# Patient Record
Sex: FEMALE | Race: WHITE | NOT HISPANIC OR LATINO | Employment: UNEMPLOYED | URBAN - METROPOLITAN AREA
[De-identification: names, ages, dates, MRNs, and addresses within clinical notes are randomized per-mention and may not be internally consistent; named-entity substitution may affect disease eponyms.]

---

## 2022-09-01 ENCOUNTER — LAB REQUISITION (OUTPATIENT)
Dept: LAB | Age: 22
End: 2022-09-01

## 2022-09-01 PROCEDURE — 86480 TB TEST CELL IMMUN MEASURE: CPT | Performed by: CLINICAL MEDICAL LABORATORY

## 2022-09-01 PROCEDURE — PSEU9049 QUANTIFERON TB PLUS: Performed by: CLINICAL MEDICAL LABORATORY

## 2022-09-03 LAB
GAMMA INTERFERON BACKGROUND BLD IA-ACNC: 0.15 IU/ML
M TB IFN-G BLD-IMP: ABNORMAL
M TB IFN-G CD4+ BCKGRND COR BLD-ACNC: 0 IU/ML
M TB IFN-G CD4+CD8+ BCKGRND COR BLD-ACNC: 0.01 IU/ML
MITOGEN IGNF BCKGRD COR BLD-ACNC: 0.39 IU/ML

## 2023-12-08 ENCOUNTER — APPOINTMENT (OUTPATIENT)
Dept: URBAN - METROPOLITAN AREA CLINIC 246 | Age: 23
Setting detail: DERMATOLOGY
End: 2023-12-11

## 2023-12-08 DIAGNOSIS — A63.0 ANOGENITAL (VENEREAL) WARTS: ICD-10-CM

## 2023-12-08 PROCEDURE — OTHER TREATMENT REGIMEN: OTHER

## 2023-12-08 PROCEDURE — 99203 OFFICE O/P NEW LOW 30 MIN: CPT

## 2023-12-08 PROCEDURE — 56501 DESTROY VULVA LESIONS SIM: CPT

## 2023-12-08 PROCEDURE — OTHER COUNSELING: OTHER

## 2023-12-08 PROCEDURE — OTHER MIPS QUALITY: OTHER

## 2023-12-08 PROCEDURE — OTHER BENIGN DESTRUCTION (GENITALS): OTHER

## 2023-12-08 ASSESSMENT — LOCATION DETAILED DESCRIPTION DERM
LOCATION DETAILED: RIGHT MONS PUBIS
LOCATION DETAILED: RIGHT LABIUM MAJUS
LOCATION DETAILED: RIGHT MEDIAL POSTERIOR THIGH
LOCATION DETAILED: LEFT LABIUM MINUS
LOCATION DETAILED: RIGHT LABIUM MINUS
LOCATION DETAILED: LEFT MONS PUBIS
LOCATION DETAILED: LEFT PERINEUM

## 2023-12-08 ASSESSMENT — LOCATION SIMPLE DESCRIPTION DERM
LOCATION SIMPLE: LEFT PERINEUM
LOCATION SIMPLE: RIGHT INNER THIGH
LOCATION SIMPLE: LABIA MAJORA
LOCATION SIMPLE: LABIA MINORA
LOCATION SIMPLE: MONS PUBIS

## 2023-12-08 ASSESSMENT — LOCATION ZONE DERM
LOCATION ZONE: VULVA
LOCATION ZONE: LEG
LOCATION ZONE: VULVA
LOCATION ZONE: PERINEUM

## 2023-12-08 NOTE — PROCEDURE: BENIGN DESTRUCTION (GENITALS)
Render Post-Care Instructions In Note?: yes
Warning: This plan will only bill for destructions on the Penis and Vulva. If you select the Scrotum it will not bill.
Consent: The patient's consent was obtained including but not limited to risks of crusting, scabbing, blistering, scarring, darker or lighter pigmentary change, recurrence, incomplete removal and infection.
Include Z78.9 (Other Specified Conditions Influencing Health Status) As An Associated Diagnosis?: No
Detail Level: Detailed
Method: liquid nitrogen
Medical Necessity Information: It is in your best interest to select a reason for this procedure from the list below. All of these items fulfill various CMS LCD requirements except the new and changing color options.
Post-Care Instructions: I reviewed with the patient in detail post-care instructions. Patient is to wear sunprotection, and avoid picking at any of the treated lesions. Pt may apply Vaseline to crusted or scabbing areas.
Medical Necessity Clause: This procedure was medically necessary because the lesions that were treated were:

## 2023-12-08 NOTE — PROCEDURE: MIPS QUALITY
Quality 130: Documentation Of Current Medications In The Medical Record: Current Medications Documented
Quality 226: Preventive Care And Screening: Tobacco Use: Screening And Cessation Intervention: Patient screened for tobacco use and is an ex/non-smoker
Quality 431: Preventive Care And Screening: Unhealthy Alcohol Use - Screening: Patient not identified as an unhealthy alcohol user when screened for unhealthy alcohol use using a systematic screening method
Detail Level: Detailed
Pt diet recall obtained, reflects overall healthy diet followed: oatmeal with nuts, egg with salsa for breakfast; apple with peanut butter, hummus and carrot sticks for lunch; corn chips and salsa for snack; salmon burger and salad for dinner./n/a

## 2023-12-08 NOTE — PROCEDURE: TREATMENT REGIMEN
Detail Level: Zone
Plan: RTC in 1 month to recheck lesions
Continue Regimen: Start imiquimod topical in 3-4 days. Begin using every once every 3rd day, increase as tolerated.

## 2024-01-05 ENCOUNTER — APPOINTMENT (OUTPATIENT)
Dept: URBAN - METROPOLITAN AREA CLINIC 246 | Age: 24
Setting detail: DERMATOLOGY
End: 2024-01-05

## 2024-01-05 DIAGNOSIS — A63.0 ANOGENITAL (VENEREAL) WARTS: ICD-10-CM

## 2024-01-05 PROCEDURE — OTHER COUNSELING: OTHER

## 2024-01-05 PROCEDURE — OTHER PRESCRIPTION: OTHER

## 2024-01-05 PROCEDURE — OTHER BENIGN DESTRUCTION (GENITALS): OTHER

## 2024-01-05 PROCEDURE — OTHER TREATMENT REGIMEN: OTHER

## 2024-01-05 PROCEDURE — OTHER MIPS QUALITY: OTHER

## 2024-01-05 RX ORDER — IMIQUIMOD 12.5 MG/.25G
CREAM TOPICAL
Qty: 24 | Refills: 1 | Status: ERX | COMMUNITY
Start: 2024-01-05

## 2024-01-05 ASSESSMENT — LOCATION ZONE DERM
LOCATION ZONE: LEG
LOCATION ZONE: VULVA

## 2024-01-05 ASSESSMENT — LOCATION SIMPLE DESCRIPTION DERM
LOCATION SIMPLE: LEFT INNER THIGH
LOCATION SIMPLE: LABIA MAJORA
LOCATION SIMPLE: RIGHT INNER THIGH

## 2024-01-05 ASSESSMENT — LOCATION DETAILED DESCRIPTION DERM
LOCATION DETAILED: LEFT MEDIAL POSTERIOR THIGH
LOCATION DETAILED: RIGHT LABIUM MAJUS
LOCATION DETAILED: RIGHT MEDIAL POSTERIOR THIGH

## 2024-02-02 ENCOUNTER — APPOINTMENT (OUTPATIENT)
Dept: URBAN - METROPOLITAN AREA CLINIC 246 | Age: 24
Setting detail: DERMATOLOGY
End: 2024-02-02

## 2024-02-02 DIAGNOSIS — A63.0 ANOGENITAL (VENEREAL) WARTS: ICD-10-CM

## 2024-02-02 PROCEDURE — OTHER COUNSELING: OTHER

## 2024-02-02 PROCEDURE — 17110 DESTRUCT B9 LESION 1-14: CPT

## 2024-02-02 PROCEDURE — OTHER LIQUID NITROGEN GENITALS MULTI: OTHER

## 2024-02-02 PROCEDURE — OTHER TREATMENT REGIMEN: OTHER

## 2024-02-02 PROCEDURE — OTHER PRESCRIPTION: OTHER

## 2024-02-02 PROCEDURE — OTHER MIPS QUALITY: OTHER

## 2024-02-02 RX ORDER — IMIQUIMOD 12.5 MG/.25G
CREAM TOPICAL
Qty: 24 | Refills: 1 | Status: CANCELLED

## 2024-02-02 ASSESSMENT — LOCATION SIMPLE DESCRIPTION DERM
LOCATION SIMPLE: BUTTOCK
LOCATION SIMPLE: LABIA MAJORA
LOCATION SIMPLE: PERINEUM

## 2024-02-02 ASSESSMENT — LOCATION DETAILED DESCRIPTION DERM
LOCATION DETAILED: PERINEUM
LOCATION DETAILED: RIGHT BUTTOCK
LOCATION DETAILED: LEFT BUTTOCK
LOCATION DETAILED: RIGHT LABIUM MAJUS

## 2024-02-02 ASSESSMENT — LOCATION ZONE DERM
LOCATION ZONE: VULVA
LOCATION ZONE: TRUNK

## 2024-02-02 NOTE — PROCEDURE: LIQUID NITROGEN GENITALS MULTI
Total Number Of Lesions Treated: 5
Detail Level: Simple
Consent: The patient's consent was obtained including but not limited to risks of crusting, scabbing, blistering, scarring, darker or lighter pigmentary change, recurrence, incomplete removal and infection.
Post-Care Instructions: I reviewed with the patient in detail post-care instructions. Patient is to wear sunprotection, and avoid picking at any of the treated lesions. Pt may apply Vaseline to crusted or scabbing areas.
Render Post-Care Instructions In Note?: no

## 2024-03-05 ENCOUNTER — APPOINTMENT (OUTPATIENT)
Dept: URBAN - METROPOLITAN AREA CLINIC 246 | Age: 24
Setting detail: DERMATOLOGY
End: 2024-03-05

## 2024-03-05 DIAGNOSIS — A63.0 ANOGENITAL (VENEREAL) WARTS: ICD-10-CM

## 2024-03-05 PROCEDURE — OTHER LIQUID NITROGEN GENITALS MULTI: OTHER

## 2024-03-05 PROCEDURE — OTHER COUNSELING: OTHER

## 2024-03-05 PROCEDURE — 46916 CRYOSURGERY ANAL LESION(S): CPT

## 2024-03-05 PROCEDURE — OTHER MIPS QUALITY: OTHER

## 2024-03-05 PROCEDURE — OTHER BENIGN DESTRUCTION (GENITALS): OTHER

## 2024-03-05 ASSESSMENT — LOCATION DETAILED DESCRIPTION DERM
LOCATION DETAILED: GLUTEAL CLEFT
LOCATION DETAILED: RIGHT LABIUM MAJUS
LOCATION DETAILED: LEFT BUTTOCK
LOCATION DETAILED: PERIANAL SKIN
LOCATION DETAILED: RIGHT MEDIAL BUTTOCK
LOCATION DETAILED: LEFT SUPRAPUBIC SKIN

## 2024-03-05 ASSESSMENT — LOCATION ZONE DERM
LOCATION ZONE: VULVA
LOCATION ZONE: ANUS
LOCATION ZONE: TRUNK

## 2024-03-05 ASSESSMENT — LOCATION SIMPLE DESCRIPTION DERM
LOCATION SIMPLE: RIGHT BUTTOCK
LOCATION SIMPLE: GROIN
LOCATION SIMPLE: LABIA MAJORA
LOCATION SIMPLE: GLUTEAL CLEFT
LOCATION SIMPLE: PERIANAL SKIN
LOCATION SIMPLE: BUTTOCK

## 2024-03-05 NOTE — PROCEDURE: LIQUID NITROGEN GENITALS MULTI
Total Number Of Lesions Treated: 7
Detail Level: Simple
Consent: The patient's consent was obtained including but not limited to risks of crusting, scabbing, blistering, scarring, darker or lighter pigmentary change, recurrence, incomplete removal and infection.
Post-Care Instructions: I reviewed with the patient in detail post-care instructions. Patient is to wear sunprotection, and avoid picking at any of the treated lesions. Pt may apply Vaseline to crusted or scabbing areas.
Render Post-Care Instructions In Note?: no

## 2024-03-26 ENCOUNTER — APPOINTMENT (OUTPATIENT)
Dept: URBAN - METROPOLITAN AREA CLINIC 246 | Age: 24
Setting detail: DERMATOLOGY
End: 2024-03-27

## 2024-03-26 DIAGNOSIS — A63.0 ANOGENITAL (VENEREAL) WARTS: ICD-10-CM

## 2024-03-26 PROCEDURE — OTHER TREATMENT REGIMEN: OTHER

## 2024-03-26 PROCEDURE — OTHER MIPS QUALITY: OTHER

## 2024-03-26 PROCEDURE — 17110 DESTRUCT B9 LESION 1-14: CPT

## 2024-03-26 PROCEDURE — OTHER LIQUID NITROGEN GENITALS MULTI: OTHER

## 2024-03-26 PROCEDURE — OTHER COUNSELING: OTHER

## 2024-03-26 ASSESSMENT — LOCATION SIMPLE DESCRIPTION DERM
LOCATION SIMPLE: GLUTEAL CLEFT
LOCATION SIMPLE: LABIA MAJORA

## 2024-03-26 ASSESSMENT — LOCATION ZONE DERM
LOCATION ZONE: VULVA
LOCATION ZONE: TRUNK

## 2024-03-26 ASSESSMENT — LOCATION DETAILED DESCRIPTION DERM
LOCATION DETAILED: GLUTEAL CLEFT
LOCATION DETAILED: RIGHT LABIUM MAJUS

## 2024-03-26 NOTE — PROCEDURE: TREATMENT REGIMEN
Continue Regimen: imiquimod 5 % topical cream packet TP\\nSig: Apply thin layer to affected area once every other day.\\nQuantity: 24 Packet Refills: 1
Detail Level: Zone

## 2024-04-06 ENCOUNTER — V-VISIT (OUTPATIENT)
Dept: URGENT CARE | Age: 24
End: 2024-04-06

## 2024-04-06 VITALS
RESPIRATION RATE: 16 BRPM | BODY MASS INDEX: 23.92 KG/M2 | OXYGEN SATURATION: 99 % | HEIGHT: 62 IN | SYSTOLIC BLOOD PRESSURE: 136 MMHG | WEIGHT: 130 LBS | HEART RATE: 113 BPM | TEMPERATURE: 98.5 F | DIASTOLIC BLOOD PRESSURE: 86 MMHG

## 2024-04-06 DIAGNOSIS — N30.01 ACUTE CYSTITIS WITH HEMATURIA: Primary | ICD-10-CM

## 2024-04-06 LAB
APPEARANCE, POC: ABNORMAL
BILIRUBIN, POC: NEGATIVE
COLOR, POC: COLORLESS
GLUCOSE UR-MCNC: NEGATIVE MG/DL
KETONES, POC: ABNORMAL MG/DL
NITRITE, POC: NEGATIVE
OCCULT BLOOD, POC: ABNORMAL
PH UR: 6 [PH] (ref 5–7)
PROT UR-MCNC: ABNORMAL MG/DL
SP GR UR: 1.02 (ref 1–1.03)
UROBILINOGEN UR-MCNC: 0.2 MG/DL (ref 0–1)
WBC (LEUKOCYTE) ESTERASE, POC: ABNORMAL

## 2024-04-06 RX ORDER — NITROFURANTOIN 25; 75 MG/1; MG/1
100 CAPSULE ORAL 2 TIMES DAILY
Qty: 10 CAPSULE | Refills: 0 | Status: SHIPPED | OUTPATIENT
Start: 2024-04-06 | End: 2024-04-11

## 2024-04-06 RX ORDER — IMIQUIMOD 12.5 MG/.25G
CREAM TOPICAL
COMMUNITY
Start: 2024-03-17

## 2024-04-07 LAB — BACTERIA UR CULT: NORMAL

## 2024-04-09 ENCOUNTER — APPOINTMENT (OUTPATIENT)
Dept: URBAN - METROPOLITAN AREA CLINIC 246 | Age: 24
Setting detail: DERMATOLOGY
End: 2024-04-16

## 2024-04-09 DIAGNOSIS — A63.0 ANOGENITAL (VENEREAL) WARTS: ICD-10-CM

## 2024-04-09 DIAGNOSIS — B07.8 OTHER VIRAL WARTS: ICD-10-CM

## 2024-04-09 PROCEDURE — OTHER LIQUID NITROGEN GENITALS MULTI: OTHER

## 2024-04-09 PROCEDURE — 17110 DESTRUCT B9 LESION 1-14: CPT

## 2024-04-09 PROCEDURE — OTHER LIQUID NITROGEN: OTHER

## 2024-04-09 PROCEDURE — OTHER TREATMENT REGIMEN: OTHER

## 2024-04-09 PROCEDURE — OTHER COUNSELING: OTHER

## 2024-04-09 PROCEDURE — OTHER MIPS QUALITY: OTHER

## 2024-04-09 ASSESSMENT — LOCATION ZONE DERM
LOCATION ZONE: TRUNK
LOCATION ZONE: FINGER
LOCATION ZONE: VULVA

## 2024-04-09 ASSESSMENT — LOCATION SIMPLE DESCRIPTION DERM
LOCATION SIMPLE: LABIA MAJORA
LOCATION SIMPLE: GLUTEAL CLEFT
LOCATION SIMPLE: RIGHT RING FINGER

## 2024-04-09 ASSESSMENT — LOCATION DETAILED DESCRIPTION DERM
LOCATION DETAILED: RIGHT RING FINGER PROXIMAL INTERPHALANGEAL JOINT
LOCATION DETAILED: GLUTEAL CLEFT
LOCATION DETAILED: RIGHT LABIUM MAJUS

## 2024-04-09 NOTE — PROCEDURE: MIPS QUALITY
[id number]
[id number]
60
Quality 130: Documentation Of Current Medications In The Medical Record: Current Medications Documented
Quality 226: Preventive Care And Screening: Tobacco Use: Screening And Cessation Intervention: Patient screened for tobacco use and is an ex/non-smoker
Quality 431: Preventive Care And Screening: Unhealthy Alcohol Use - Screening: Patient not identified as an unhealthy alcohol user when screened for unhealthy alcohol use using a systematic screening method
Detail Level: Detailed
67

## 2024-04-09 NOTE — PROCEDURE: LIQUID NITROGEN
Spray Paint Text: The liquid nitrogen was applied to the skin utilizing a spray paint frosting technique.
Render Note In Bullet Format When Appropriate: No
Render Post-Care Instructions In Note?: yes
Detail Level: Detailed
Application Tool (Optional): Liquid Nitrogen Sprayer
Duration Of Freeze Thaw-Cycle (Seconds): 5-10
Post-Care Instructions: I reviewed with the patient in detail post-care instructions. Patient is to wear sunprotection, and avoid picking at any of the treated lesions. Pt may apply Vaseline to crusted or scabbing areas.
Number Of Freeze-Thaw Cycles: 3 freeze-thaw cycles
Medical Necessity Clause: This procedure was medically necessary because the lesions that were treated were:
Medical Necessity Information: It is in your best interest to select a reason for this procedure from the list below. All of these items fulfill various CMS LCD requirements except the new and changing color options.
Consent: The patient's consent was obtained including but not limited to risks of crusting, scabbing, blistering, scarring, darker or lighter pigmentary change, recurrence, incomplete removal and infection.

## 2024-05-01 ENCOUNTER — APPOINTMENT (OUTPATIENT)
Dept: URBAN - METROPOLITAN AREA CLINIC 246 | Age: 24
Setting detail: DERMATOLOGY
End: 2024-05-02

## 2024-05-01 DIAGNOSIS — B07.8 OTHER VIRAL WARTS: ICD-10-CM

## 2024-05-01 DIAGNOSIS — A63.0 ANOGENITAL (VENEREAL) WARTS: ICD-10-CM

## 2024-05-01 PROCEDURE — OTHER COUNSELING: OTHER

## 2024-05-01 PROCEDURE — 99213 OFFICE O/P EST LOW 20 MIN: CPT

## 2024-05-01 PROCEDURE — OTHER TREATMENT REGIMEN: OTHER

## 2024-05-01 PROCEDURE — OTHER MIPS QUALITY: OTHER

## 2024-05-01 ASSESSMENT — LOCATION ZONE DERM: LOCATION ZONE: VULVA

## 2024-05-01 ASSESSMENT — LOCATION SIMPLE DESCRIPTION DERM: LOCATION SIMPLE: LABIA MAJORA

## 2024-05-01 ASSESSMENT — LOCATION DETAILED DESCRIPTION DERM: LOCATION DETAILED: RIGHT LABIUM MAJUS

## 2024-05-01 NOTE — PROCEDURE: TREATMENT REGIMEN
Discontinue Regimen: imiquimod 5 % topical cream packet TP\\nSig: Apply thin layer to affected area once every other day.\\nQuantity: 24 Packet Refills: 1
Detail Level: Zone
Plan: Discussed pigment changes

## 2024-06-07 ENCOUNTER — APPOINTMENT (OUTPATIENT)
Dept: URBAN - METROPOLITAN AREA CLINIC 246 | Age: 24
Setting detail: DERMATOLOGY
End: 2024-06-10

## 2024-06-07 DIAGNOSIS — B07.8 OTHER VIRAL WARTS: ICD-10-CM

## 2024-06-07 DIAGNOSIS — A63.0 ANOGENITAL (VENEREAL) WARTS: ICD-10-CM

## 2024-06-07 PROCEDURE — OTHER BENIGN DESTRUCTION (GENITALS): OTHER

## 2024-06-07 PROCEDURE — OTHER MIPS QUALITY: OTHER

## 2024-06-07 PROCEDURE — 17110 DESTRUCT B9 LESION 1-14: CPT

## 2024-06-07 PROCEDURE — OTHER TREATMENT REGIMEN: OTHER

## 2024-06-07 PROCEDURE — OTHER COUNSELING: OTHER

## 2024-06-07 PROCEDURE — 56501 DESTROY VULVA LESIONS SIM: CPT

## 2024-06-07 PROCEDURE — OTHER LIQUID NITROGEN: OTHER

## 2024-06-07 ASSESSMENT — LOCATION DETAILED DESCRIPTION DERM
LOCATION DETAILED: RIGHT RING FINGER PROXIMAL INTERPHALANGEAL JOINT
LOCATION DETAILED: RIGHT LABIUM MAJUS
LOCATION DETAILED: LEFT LABIUM MAJUS

## 2024-06-07 ASSESSMENT — LOCATION SIMPLE DESCRIPTION DERM
LOCATION SIMPLE: LABIA MAJORA
LOCATION SIMPLE: RIGHT RING FINGER
LOCATION SIMPLE: LABIA MAJORA

## 2024-06-07 ASSESSMENT — LOCATION ZONE DERM
LOCATION ZONE: VULVA
LOCATION ZONE: VULVA
LOCATION ZONE: FINGER

## 2024-06-07 NOTE — PROCEDURE: LIQUID NITROGEN
Consent: The patient's consent was obtained including but not limited to risks of crusting, scabbing, blistering, scarring, darker or lighter pigmentary change, recurrence, incomplete removal and infection.
Medical Necessity Clause: This procedure was medically necessary because the lesions that were treated were:
Add 52 Modifier (Optional): no
Show Aperture Variable?: Yes
Detail Level: Detailed
Medical Necessity Information: It is in your best interest to select a reason for this procedure from the list below. All of these items fulfill various CMS LCD requirements except the new and changing color options.
Post-Care Instructions: I reviewed with the patient in detail post-care instructions. Patient is to wear sunprotection, and avoid picking at any of the treated lesions. Pt may apply Vaseline to crusted or scabbing areas.
Spray Paint Text: The liquid nitrogen was applied to the skin utilizing a spray paint frosting technique.

## 2024-06-07 NOTE — PROCEDURE: BENIGN DESTRUCTION (GENITALS)
Consent: The patient's consent was obtained including but not limited to risks of crusting, scabbing, blistering, scarring, darker or lighter pigmentary change, recurrence, incomplete removal and infection.
Detail Level: Detailed
Anesthesia Volume In Cc: 0.5
Method: liquid nitrogen
Post-Care Instructions: I reviewed with the patient in detail post-care instructions. Patient is to wear sunprotection, and avoid picking at any of the treated lesions. Pt may apply Vaseline to crusted or scabbing areas.
Warning: This plan will only bill for destructions on the Penis and Vulva. If you select the Scrotum it will not bill.
Medical Necessity Clause: This procedure was medically necessary because the lesions that were treated were:
Include Z78.9 (Other Specified Conditions Influencing Health Status) As An Associated Diagnosis?: No
Medical Necessity Information: It is in your best interest to select a reason for this procedure from the list below. All of these items fulfill various CMS LCD requirements except the new and changing color options.

## 2024-07-11 ENCOUNTER — APPOINTMENT (OUTPATIENT)
Dept: URBAN - METROPOLITAN AREA CLINIC 246 | Age: 24
Setting detail: DERMATOLOGY
End: 2024-07-11

## 2024-07-11 DIAGNOSIS — A63.0 ANOGENITAL (VENEREAL) WARTS: ICD-10-CM

## 2024-07-11 DIAGNOSIS — B07.8 OTHER VIRAL WARTS: ICD-10-CM

## 2024-07-11 DIAGNOSIS — L73.8 OTHER SPECIFIED FOLLICULAR DISORDERS: ICD-10-CM

## 2024-07-11 PROCEDURE — OTHER BENIGN DESTRUCTION (GENITALS): OTHER

## 2024-07-11 PROCEDURE — 99212 OFFICE O/P EST SF 10 MIN: CPT | Mod: 25

## 2024-07-11 PROCEDURE — OTHER COUNSELING: OTHER

## 2024-07-11 PROCEDURE — OTHER MIPS QUALITY: OTHER

## 2024-07-11 PROCEDURE — 56501 DESTROY VULVA LESIONS SIM: CPT

## 2024-07-11 ASSESSMENT — LOCATION SIMPLE DESCRIPTION DERM
LOCATION SIMPLE: LABIA MAJORA
LOCATION SIMPLE: LABIA MAJORA
LOCATION SIMPLE: RIGHT LIP

## 2024-07-11 ASSESSMENT — LOCATION ZONE DERM
LOCATION ZONE: VULVA
LOCATION ZONE: VULVA
LOCATION ZONE: LIP

## 2024-07-11 ASSESSMENT — LOCATION DETAILED DESCRIPTION DERM
LOCATION DETAILED: RIGHT LABIUM MAJUS
LOCATION DETAILED: LEFT LABIUM MAJUS
LOCATION DETAILED: RIGHT INFERIOR VERMILION LIP

## 2024-08-12 ENCOUNTER — APPOINTMENT (OUTPATIENT)
Dept: URBAN - METROPOLITAN AREA CLINIC 246 | Age: 24
Setting detail: DERMATOLOGY
End: 2024-08-12

## 2024-08-12 DIAGNOSIS — A63.0 ANOGENITAL (VENEREAL) WARTS: ICD-10-CM

## 2024-08-12 PROCEDURE — 56501 DESTROY VULVA LESIONS SIM: CPT

## 2024-08-12 PROCEDURE — OTHER MIPS QUALITY: OTHER

## 2024-08-12 PROCEDURE — OTHER COUNSELING: OTHER

## 2024-08-12 PROCEDURE — OTHER BENIGN DESTRUCTION (GENITALS): OTHER

## 2024-08-12 ASSESSMENT — LOCATION ZONE DERM
LOCATION ZONE: VULVA
LOCATION ZONE: VULVA

## 2024-08-12 ASSESSMENT — LOCATION DETAILED DESCRIPTION DERM
LOCATION DETAILED: LEFT LABIUM MAJUS
LOCATION DETAILED: RIGHT LABIUM MAJUS

## 2024-08-12 ASSESSMENT — LOCATION SIMPLE DESCRIPTION DERM
LOCATION SIMPLE: LABIA MAJORA
LOCATION SIMPLE: LABIA MAJORA

## 2024-09-10 ENCOUNTER — APPOINTMENT (OUTPATIENT)
Dept: URBAN - METROPOLITAN AREA CLINIC 246 | Age: 24
Setting detail: DERMATOLOGY
End: 2024-09-10

## 2024-09-10 DIAGNOSIS — A63.0 ANOGENITAL (VENEREAL) WARTS: ICD-10-CM

## 2024-09-10 PROCEDURE — OTHER MIPS QUALITY: OTHER

## 2024-09-10 PROCEDURE — OTHER COUNSELING: OTHER

## 2024-09-10 PROCEDURE — 99212 OFFICE O/P EST SF 10 MIN: CPT

## 2024-09-10 PROCEDURE — OTHER DIAGNOSIS COMMENT: OTHER

## 2024-09-10 ASSESSMENT — LOCATION SIMPLE DESCRIPTION DERM: LOCATION SIMPLE: LABIA MAJORA

## 2024-09-10 ASSESSMENT — LOCATION ZONE DERM: LOCATION ZONE: VULVA

## 2024-09-10 ASSESSMENT — LOCATION DETAILED DESCRIPTION DERM: LOCATION DETAILED: RIGHT LABIUM MAJUS

## 2024-11-24 ENCOUNTER — APPOINTMENT (OUTPATIENT)
Dept: URGENT CARE | Age: 24
End: 2024-11-24

## 2024-12-11 ENCOUNTER — APPOINTMENT (OUTPATIENT)
Dept: URBAN - METROPOLITAN AREA CLINIC 246 | Age: 24
Setting detail: DERMATOLOGY
End: 2024-12-11

## 2024-12-11 DIAGNOSIS — A63.0 ANOGENITAL (VENEREAL) WARTS: ICD-10-CM

## 2024-12-11 PROCEDURE — 17110 DESTRUCT B9 LESION 1-14: CPT

## 2024-12-11 PROCEDURE — OTHER LIQUID NITROGEN: OTHER

## 2024-12-11 PROCEDURE — OTHER MIPS QUALITY: OTHER

## 2024-12-11 PROCEDURE — OTHER DIAGNOSIS COMMENT: OTHER

## 2024-12-11 PROCEDURE — OTHER COUNSELING: OTHER

## 2024-12-11 ASSESSMENT — LOCATION DETAILED DESCRIPTION DERM
LOCATION DETAILED: RIGHT LABIUM MAJUS
LOCATION DETAILED: RIGHT INGUINAL FOLD

## 2024-12-11 ASSESSMENT — LOCATION SIMPLE DESCRIPTION DERM
LOCATION SIMPLE: LABIA MAJORA
LOCATION SIMPLE: RIGHT INGUINAL FOLD

## 2024-12-11 ASSESSMENT — LOCATION ZONE DERM
LOCATION ZONE: VULVA
LOCATION ZONE: LEG

## 2024-12-11 NOTE — PROCEDURE: LIQUID NITROGEN
Consent: The patient's consent was obtained including but not limited to risks of crusting, scabbing, blistering, scarring, darker or lighter pigmentary change, recurrence, incomplete removal and infection.
Medical Necessity Clause: This procedure was medically necessary because the lesions that were treated were:
Duration Of Freeze Thaw-Cycle (Seconds): 10
Number Of Freeze-Thaw Cycles: 1 freeze-thaw cycle
Include Z78.9 (Other Specified Conditions Influencing Health Status) As An Associated Diagnosis?: No
Post-Care Instructions: I reviewed with the patient in detail post-care instructions. Patient is to wear sunprotection, and avoid picking at any of the treated lesions. Pt may apply Vaseline to crusted or scabbing areas.
Spray Paint Text: The liquid nitrogen was applied to the skin utilizing a spray paint frosting technique.
Show Topical Anesthesia Variable?: Yes
Medical Necessity Information: It is in your best interest to select a reason for this procedure from the list below. All of these items fulfill various CMS LCD requirements except the new and changing color options.
Detail Level: Detailed

## 2025-04-22 ENCOUNTER — APPOINTMENT (OUTPATIENT)
Dept: URBAN - METROPOLITAN AREA CLINIC 246 | Age: 25
Setting detail: DERMATOLOGY
End: 2025-04-22

## 2025-04-22 DIAGNOSIS — D18.0 HEMANGIOMA: ICD-10-CM

## 2025-04-22 DIAGNOSIS — L73.9 FOLLICULAR DISORDER, UNSPECIFIED: ICD-10-CM

## 2025-04-22 DIAGNOSIS — A63.0 ANOGENITAL (VENEREAL) WARTS: ICD-10-CM

## 2025-04-22 DIAGNOSIS — D22 MELANOCYTIC NEVI: ICD-10-CM

## 2025-04-22 DIAGNOSIS — Z71.89 OTHER SPECIFIED COUNSELING: ICD-10-CM

## 2025-04-22 DIAGNOSIS — Z12.83 ENCOUNTER FOR SCREENING FOR MALIGNANT NEOPLASM OF SKIN: ICD-10-CM

## 2025-04-22 PROBLEM — D22.5 MELANOCYTIC NEVI OF TRUNK: Status: ACTIVE | Noted: 2025-04-22

## 2025-04-22 PROBLEM — D18.01 HEMANGIOMA OF SKIN AND SUBCUTANEOUS TISSUE: Status: ACTIVE | Noted: 2025-04-22

## 2025-04-22 PROCEDURE — OTHER MIPS QUALITY: OTHER

## 2025-04-22 PROCEDURE — OTHER COUNSELING: OTHER

## 2025-04-22 PROCEDURE — 99213 OFFICE O/P EST LOW 20 MIN: CPT

## 2025-04-22 ASSESSMENT — LOCATION SIMPLE DESCRIPTION DERM
LOCATION SIMPLE: CHEST
LOCATION SIMPLE: LEFT THIGH
LOCATION SIMPLE: LABIA MAJORA
LOCATION SIMPLE: LEFT UPPER BACK
LOCATION SIMPLE: ANUS

## 2025-04-22 ASSESSMENT — LOCATION ZONE DERM
LOCATION ZONE: TRUNK
LOCATION ZONE: ANUS
LOCATION ZONE: VULVA
LOCATION ZONE: LEG

## 2025-04-22 ASSESSMENT — LOCATION DETAILED DESCRIPTION DERM
LOCATION DETAILED: LEFT SUPERIOR MEDIAL UPPER BACK
LOCATION DETAILED: LEFT MEDIAL SUPERIOR CHEST
LOCATION DETAILED: LEFT PERIANAL SKIN
LOCATION DETAILED: LEFT LABIUM MAJUS
LOCATION DETAILED: LEFT ANTERIOR PROXIMAL THIGH
